# Patient Record
Sex: FEMALE | Race: BLACK OR AFRICAN AMERICAN | NOT HISPANIC OR LATINO | Employment: UNEMPLOYED | URBAN - METROPOLITAN AREA
[De-identification: names, ages, dates, MRNs, and addresses within clinical notes are randomized per-mention and may not be internally consistent; named-entity substitution may affect disease eponyms.]

---

## 2018-10-15 ENCOUNTER — OFFICE VISIT (OUTPATIENT)
Dept: GASTROENTEROLOGY | Facility: CLINIC | Age: 58
End: 2018-10-15

## 2018-10-15 VITALS — SYSTOLIC BLOOD PRESSURE: 145 MMHG | HEART RATE: 78 BPM | WEIGHT: 199.31 LBS | DIASTOLIC BLOOD PRESSURE: 87 MMHG

## 2018-10-15 DIAGNOSIS — K58.2 IRRITABLE BOWEL SYNDROME WITH BOTH CONSTIPATION AND DIARRHEA: ICD-10-CM

## 2018-10-15 DIAGNOSIS — R10.84 GENERALIZED ABDOMINAL PAIN: ICD-10-CM

## 2018-10-15 DIAGNOSIS — K52.9 CHRONIC DIARRHEA: Primary | ICD-10-CM

## 2018-10-15 PROCEDURE — 99999 PR PBB SHADOW E&M-NEW PATIENT-LVL III: CPT | Mod: PBBFAC,,, | Performed by: INTERNAL MEDICINE

## 2018-10-15 PROCEDURE — 99499 UNLISTED E&M SERVICE: CPT | Mod: S$PBB,,, | Performed by: INTERNAL MEDICINE

## 2018-10-15 PROCEDURE — 99203 OFFICE O/P NEW LOW 30 MIN: CPT | Mod: PBBFAC,PO | Performed by: INTERNAL MEDICINE

## 2018-10-15 NOTE — PROGRESS NOTES
Subjective:       Patient ID: Willa Ribeiro is a 57 y.o. female.    Chief Complaint: GI Problem (IBS) and Constipation    HPI  Review of Systems    Objective:      Physical Exam    Assessment:         Plan:

## 2018-10-15 NOTE — PATIENT INSTRUCTIONS
Tips to Control Acid Reflux    To control acid reflux, youll need to make some basic diet and lifestyle changes. The simple steps outlined below may be all youll need to ease discomfort.  Watch what you eat  · Avoid fatty foods and spicy foods.  · Eat fewer acidic foods, such as citrus and tomato-based foods. These can increase symptoms.  · Limit drinking alcohol, caffeine, and fizzy beverages. All increase acid reflux.  · Try limiting chocolate, peppermint, and spearmint. These can worsen acid reflux in some people.  Watch when you eat  · Avoid lying down for 3 hours after eating.  · Do not snack before going to bed.  Raise your head  Raising your head and upper body by 4 to 6 inches helps limit reflux when youre lying down. Put blocks under the head of your bed frame to raise it.  Other changes  · Lose weight, if you need to  · Dont exercise near bedtime  · Avoid tight-fitting clothes  · Limit aspirin and ibuprofen  · Stop smoking   Date Last Reviewed: 7/1/2016 © 2000-2017 [employer]. [address] [place] [address], [place] [address]. All rights reserved. This information is not intended as a substitute for professional medical care. Always follow your healthcare professional's instructions.        Tips to Control Acid Reflux    To control acid reflux, youll need to make some basic diet and lifestyle changes. The simple steps outlined below may be all youll need to ease discomfort.  Watch what you eat  · Avoid fatty foods and spicy foods.  · Eat fewer acidic foods, such as citrus and tomato-based foods. These can increase symptoms.  · Limit drinking alcohol, caffeine, and fizzy beverages. All increase acid reflux.  · Try limiting chocolate, peppermint, and spearmint. These can worsen acid reflux in some people.  Watch when you eat  · Avoid lying down for 3 hours after eating.  · Do not snack before going to bed.  Raise your head  Raising your head and upper body by 4 to 6 inches helps limit reflux  when youre lying down. Put blocks under the head of your bed frame to raise it.  Other changes  · Lose weight, if you need to  · Dont exercise near bedtime  · Avoid tight-fitting clothes  · Limit aspirin and ibuprofen  · Stop smoking   Date Last Reviewed: 7/1/2016  © 7897-1123 [employer]. [address] [place] [address], [place] [address]. All rights reserved. This information is not intended as a substitute for professional medical care. Always follow your healthcare professional's instructions.

## 2018-10-16 NOTE — PROGRESS NOTES
Subjective:       Patient ID: Willa Ribeiro is a 57 y.o. female.    Chief Complaint: GI Problem (IBS) and Constipation    HPI Patient with a life time history of IBS. She is having ongoing diarrhea alternating with constipation. She experiences bloating and diffuse abdominal pain.   The diarrhea is a main complaint. She has a BM after every meal up to 6 times per day. There is no weight loss. She is up to date with her colonoscopy. There is no family history of colon cancer.      Review of Systems   Constitutional: Negative for appetite change and fever.   HENT: Negative for sore throat and trouble swallowing.    Eyes: Negative for photophobia and visual disturbance.   Respiratory: Negative for wheezing.    Cardiovascular: Negative for chest pain and palpitations.   Gastrointestinal:        See HPI for details   Musculoskeletal: Negative for arthralgias, joint swelling and neck pain.   Skin: Negative for rash and wound.   Neurological: Negative for dizziness, tremors and weakness.   Psychiatric/Behavioral: Negative for behavioral problems and suicidal ideas.       Past Medical History:   Diagnosis Date    DM (diabetes mellitus)     HTN (hypertension)        Past Surgical History:   Procedure Laterality Date    CHOLECYSTECTOMY      HYSTERECTOMY         History reviewed. No pertinent family history.    Social History     Socioeconomic History    Marital status: Single     Spouse name: None    Number of children: None    Years of education: None    Highest education level: None   Social Needs    Financial resource strain: None    Food insecurity - worry: None    Food insecurity - inability: None    Transportation needs - medical: None    Transportation needs - non-medical: None   Occupational History    None   Tobacco Use    Smoking status: Never Smoker   Substance and Sexual Activity    Alcohol use: No     Frequency: Never    Drug use: None    Sexual activity: None   Other Topics Concern    None    Social History Narrative    None           Review of patient's allergies indicates:   Allergen Reactions    Penicillins Hives       Objective:      Physical Exam   Constitutional: She is oriented to person, place, and time. She appears well-nourished.   HENT:   Mouth/Throat: Oropharynx is clear and moist.   Eyes: Pupils are equal, round, and reactive to light.   Neck: Neck supple.   Cardiovascular: Normal heart sounds.   Pulmonary/Chest: Effort normal and breath sounds normal.   Abdominal: Soft. She exhibits no mass. There is no tenderness. There is no guarding.   Musculoskeletal: Normal range of motion.   Lymphadenopathy:     She has no cervical adenopathy.   Neurological: She is alert and oriented to person, place, and time.   Skin: Skin is warm. No rash noted.   Psychiatric: She has a normal mood and affect.       Assessment:       1. Chronic diarrhea    2. Generalized abdominal pain    3. Irritable bowel syndrome with both constipation and diarrhea        Plan:       Willa was seen today for gi problem and constipation.    Diagnoses and all orders for this visit:    Chronic diarrhea  -     Celiac Disease Panel; Future    Generalized abdominal pain  -     CT Abdomen Pelvis W Wo Contrast; Future

## 2018-10-17 ENCOUNTER — TELEPHONE (OUTPATIENT)
Dept: OBSTETRICS AND GYNECOLOGY | Facility: CLINIC | Age: 58
End: 2018-10-17

## 2018-10-17 ENCOUNTER — LAB VISIT (OUTPATIENT)
Dept: LAB | Facility: HOSPITAL | Age: 58
End: 2018-10-17
Attending: INTERNAL MEDICINE

## 2018-10-17 DIAGNOSIS — K52.9 CHRONIC DIARRHEA: ICD-10-CM

## 2018-10-17 DIAGNOSIS — R10.9 ABDOMINAL PAIN, UNSPECIFIED ABDOMINAL LOCATION: Primary | ICD-10-CM

## 2018-10-17 PROCEDURE — 83516 IMMUNOASSAY NONANTIBODY: CPT | Mod: 59

## 2018-10-18 ENCOUNTER — HOSPITAL ENCOUNTER (OUTPATIENT)
Dept: RADIOLOGY | Facility: HOSPITAL | Age: 58
Discharge: HOME | End: 2018-10-18
Attending: INTERNAL MEDICINE

## 2018-10-18 DIAGNOSIS — R10.84 GENERALIZED ABDOMINAL PAIN: ICD-10-CM

## 2018-10-18 PROCEDURE — 74178 CT ABD&PLV WO CNTR FLWD CNTR: CPT | Mod: 26,,, | Performed by: RADIOLOGY

## 2018-10-18 PROCEDURE — 25500020 PHARM REV CODE 255: Performed by: INTERNAL MEDICINE

## 2018-10-18 PROCEDURE — 74178 CT ABD&PLV WO CNTR FLWD CNTR: CPT | Mod: TC

## 2018-10-18 RX ADMIN — IOHEXOL 30 ML: 350 INJECTION, SOLUTION INTRAVENOUS at 11:10

## 2018-10-18 RX ADMIN — IOHEXOL 100 ML: 350 INJECTION, SOLUTION INTRAVENOUS at 01:10

## 2018-10-19 LAB
GLIADIN PEPTIDE IGA SER-ACNC: 5 UNITS
GLIADIN PEPTIDE IGG SER-ACNC: 3 UNITS
IGA SERPL-MCNC: 519 MG/DL
TTG IGA SER IA-ACNC: 5 UNITS
TTG IGG SER IA-ACNC: 4 UNITS

## 2018-10-23 ENCOUNTER — TELEPHONE (OUTPATIENT)
Dept: GASTROENTEROLOGY | Facility: CLINIC | Age: 58
End: 2018-10-23

## 2018-10-23 RX ORDER — DICYCLOMINE HYDROCHLORIDE 10 MG/1
10 CAPSULE ORAL 4 TIMES DAILY PRN
Qty: 120 CAPSULE | Refills: 6 | Status: SHIPPED | OUTPATIENT
Start: 2018-10-23 | End: 2018-11-22

## 2023-04-14 DIAGNOSIS — E66.9 DIABETES MELLITUS TYPE 2 IN OBESE: Primary | ICD-10-CM

## 2023-04-14 DIAGNOSIS — E11.69 DIABETES MELLITUS TYPE 2 IN OBESE: Primary | ICD-10-CM

## 2023-04-14 RX ORDER — SEMAGLUTIDE 1.34 MG/ML
INJECTION, SOLUTION SUBCUTANEOUS
Qty: 3 ML | Refills: 1 | Status: SHIPPED | OUTPATIENT
Start: 2023-04-14 | End: 2023-04-14 | Stop reason: SDUPTHER

## 2023-04-14 RX ORDER — SEMAGLUTIDE 1.34 MG/ML
INJECTION, SOLUTION SUBCUTANEOUS
Qty: 3 ML | Refills: 1 | Status: SHIPPED | OUTPATIENT
Start: 2023-04-14

## 2025-06-24 DIAGNOSIS — M25.562 KNEE PAIN, LEFT ANTERIOR: Primary | ICD-10-CM

## 2025-06-24 DIAGNOSIS — M23.8X2 KNEE CREPITUS, LEFT: ICD-10-CM

## 2025-06-24 DIAGNOSIS — M25.469 KNEE SWELLING: ICD-10-CM

## 2025-07-10 ENCOUNTER — HOSPITAL ENCOUNTER (EMERGENCY)
Facility: HOSPITAL | Age: 65
Discharge: HOME | End: 2025-07-10
Attending: EMERGENCY MEDICINE
Payer: MEDICAID

## 2025-07-10 VITALS
TEMPERATURE: 98 F | HEIGHT: 63 IN | HEART RATE: 92 BPM | OXYGEN SATURATION: 97 % | BODY MASS INDEX: 33.84 KG/M2 | RESPIRATION RATE: 16 BRPM | WEIGHT: 191 LBS | DIASTOLIC BLOOD PRESSURE: 75 MMHG | SYSTOLIC BLOOD PRESSURE: 152 MMHG

## 2025-07-10 DIAGNOSIS — M67.88 ACHILLES TENDINOSIS OF LEFT LOWER EXTREMITY: Primary | ICD-10-CM

## 2025-07-10 DIAGNOSIS — R52 PAIN: ICD-10-CM

## 2025-07-10 PROCEDURE — 99284 EMERGENCY DEPT VISIT MOD MDM: CPT | Mod: 25

## 2025-07-10 PROCEDURE — 63600175 PHARM REV CODE 636 W HCPCS: Mod: JZ,TB | Performed by: EMERGENCY MEDICINE

## 2025-07-10 PROCEDURE — 96372 THER/PROPH/DIAG INJ SC/IM: CPT | Performed by: EMERGENCY MEDICINE

## 2025-07-10 RX ORDER — KETOROLAC TROMETHAMINE 30 MG/ML
15 INJECTION, SOLUTION INTRAMUSCULAR; INTRAVENOUS
Status: COMPLETED | OUTPATIENT
Start: 2025-07-10 | End: 2025-07-10

## 2025-07-10 RX ORDER — LORATADINE 10 MG/1
TABLET ORAL
COMMUNITY
Start: 2025-06-18

## 2025-07-10 RX ORDER — INSULIN GLARGINE 100 [IU]/ML
INJECTION, SOLUTION SUBCUTANEOUS
COMMUNITY
Start: 2025-06-18

## 2025-07-10 RX ORDER — ASPIRIN 81 MG/1
81 TABLET ORAL
COMMUNITY

## 2025-07-10 RX ORDER — METFORMIN HYDROCHLORIDE 1000 MG/1
1000 TABLET ORAL
COMMUNITY

## 2025-07-10 RX ORDER — CALCIUM CITRATE/VITAMIN D3 200MG-6.25
TABLET ORAL
COMMUNITY
Start: 2025-06-18

## 2025-07-10 RX ORDER — GLUCOSAM/CHON-MSM1/C/MANG/BOSW 500-416.6
TABLET ORAL
COMMUNITY
Start: 2025-01-15

## 2025-07-10 RX ORDER — ROSUVASTATIN CALCIUM 10 MG/1
TABLET, COATED ORAL
COMMUNITY
Start: 2025-06-19

## 2025-07-10 RX ORDER — ACYCLOVIR 400 MG/1
TABLET ORAL
COMMUNITY
Start: 2025-06-18

## 2025-07-10 RX ORDER — TIRZEPATIDE 10 MG/.5ML
INJECTION, SOLUTION SUBCUTANEOUS
COMMUNITY
Start: 2025-06-18

## 2025-07-10 RX ORDER — DEXAMETHASONE SODIUM PHOSPHATE 4 MG/ML
8 INJECTION, SOLUTION INTRA-ARTICULAR; INTRALESIONAL; INTRAMUSCULAR; INTRAVENOUS; SOFT TISSUE
Status: COMPLETED | OUTPATIENT
Start: 2025-07-10 | End: 2025-07-10

## 2025-07-10 RX ORDER — PANTOPRAZOLE SODIUM 40 MG/1
40 TABLET, DELAYED RELEASE ORAL
COMMUNITY

## 2025-07-10 RX ORDER — LISINOPRIL AND HYDROCHLOROTHIAZIDE 20; 25 MG/1; MG/1
1 TABLET ORAL
COMMUNITY
Start: 2024-09-17

## 2025-07-10 RX ADMIN — KETOROLAC TROMETHAMINE 15 MG: 30 INJECTION, SOLUTION INTRAMUSCULAR; INTRAVENOUS at 09:07

## 2025-07-10 RX ADMIN — DEXAMETHASONE SODIUM PHOSPHATE 8 MG: 4 INJECTION, SOLUTION INTRA-ARTICULAR; INTRALESIONAL; INTRAMUSCULAR; INTRAVENOUS; SOFT TISSUE at 09:07

## 2025-07-11 NOTE — ED PROVIDER NOTES
Emergency Department Encounter  Provider Note  Encounter Date: 7/10/2025    Patient Name: Willa Ribeiro  MRN: [id number]    History of Present Illness   HPI  History of Present Illness:    Chief Complaint:   Chief Complaint   Patient presents with    Foot Injury     Left Heel pain x 4 weeks.  Has not been to a physician about this issue at this time.  Denies injury.  Stated she ices it at home and takes IBU without relief.       64-year-old female presenting with 5 weeks of left heel pain.  Atraumatic.  No fevers or chills.  Able to ambulate.  Has tried over-the-counter medication without improvement in symptoms.    The following PMH/PSH/SocHx/FamHx has been reviewed by myself:  Past Medical History:   Diagnosis Date    DM (diabetes mellitus)     HTN (hypertension)      Past Surgical History:   Procedure Laterality Date    CHOLECYSTECTOMY      HYSTERECTOMY       Social History[1]  No family history on file.  Allergies reviewed:  Review of patient's allergies indicates:   Allergen Reactions    Penicillins Hives     Medications reviewed:  Discharge Medication List as of 7/10/2025  9:36 PM        CONTINUE these medications which have NOT CHANGED    Details   acyclovir (ZOVIRAX) 400 MG tablet Historical Med      LANTUS SOLOSTAR U-100 INSULIN 100 unit/mL (3 mL) InPn pen Historical Med      lisinopriL-hydrochlorothiazide (PRINZIDE,ZESTORETIC) 20-25 mg Tab Take 1 tablet by mouth., Starting Tue 9/17/2024, Historical Med      loratadine (CLARITIN) 10 mg tablet Historical Med      MOUNJARO 10 mg/0.5 mL PnIj Historical Med      rosuvastatin (CRESTOR) 10 MG tablet Historical Med      TRUE METRIX GLUCOSE TEST STRIP Strp Historical Med      TRUEPLUS LANCETS 30 gauge Misc Historical Med      aspirin (ECOTRIN) 81 MG EC tablet Take 81 mg by mouth., Historical Med      metFORMIN (GLUCOPHAGE) 1000 MG tablet Take 1,000 mg by mouth., Historical Med      OZEMPIC 0.25 mg or 0.5 mg(2 mg/1.5 mL) pen injector 0.25mg injected once weekly for 4  weeks followed by 0.5mg injected once a week for 4 weeks, Print      pantoprazole (PROTONIX) 40 MG tablet Take 40 mg by mouth., Historical Med             Physical Exam     Initial Vitals [07/10/25 2036]   BP Pulse Resp Temp SpO2   (!) 152/75 92 16 98.2 °F (36.8 °C) 97 %      MAP       --           Triage vital signs reviewed.    Constitutional: Well-nourished, well-developed. Not in acute distress.  HENT: Normocephalic, atraumatic. Moist mucous membranes.  Eyes: No conjunctival injection.  Resp: Normal respiratory effort, breathing unlabored.  Cardio: Regular rate and rhythm.  GI: Abdomen non-distended.   MSK:  Swollen left heel, pain with palpation of the insertion of the Achilles tendon.  Able to range.  No laxity.  Skin: Warm and dry. No rashes or lesions noted.  Neuro: Awake and alert. Moves all extremities.    ED Course   Procedures    Medical Decision Making    History Acquisition     The history is provided by the patient.     Review of prior external/non ED notes:  Most recent eval GI for surveillance colonoscopy 11/2024    Differential Diagnoses   Based on available information and initial assessment, the working Differential Diagnosis includes, but is not limited to:  Fracture, dislocation, compartment syndrome, nerve injury/palsy, vascular injury, DVT, rhabdomyolysis, hemarthrosis, septic joint, cellulitis, bursitis, muscle strain, ligament tear/sprain, laceration, foreign body, abrasion, soft tissue contusion, osteoarthritis.    EKG   EKG ordered and independently reviewed by me:     Labs   Lab tests ordered and independently reviewed by me:    Labs Reviewed - No data to display    Imaging   Imaging ordered and independently reviewed by me:   Imaging Results              X-Ray Ankle Complete Left (Final result)  Result time 07/10/25 21:29:07      Final result by Emanuel Camp DO (07/10/25 21:29:07)                   Impression:      No acute osseous abnormality of the ankle or  foot.      Electronically signed by: Emanuel Camp  Date:    07/10/2025  Time:    21:29               Narrative:    EXAMINATION:  XR ANKLE COMPLETE 3 VIEW LEFT; XR FOOT COMPLETE 3 VIEW LEFT    CLINICAL HISTORY:  Pain, unspecified    TECHNIQUE:  AP, lateral and oblique radiographs of the left ankle.    AP, oblique, and lateral radiographs of the left foot.    COMPARISON:  None    FINDINGS:  Left ankle: No acute fracture or dislocation. Alignment is normal. The ankle mortise is congruent. The talar dome is intact. Joint spaces are preserved. No effusion.    Left foot: No acute fracture or dislocation. Alignment is normal. The Lisfranc articulation is congruent. There is joint space narrowing of the great toe metatarsophalangeal joint.  There is an os peroneum.                                       X-Ray Foot Complete Left (Final result)  Result time 07/10/25 21:29:07      Final result by Emanuel Camp DO (07/10/25 21:29:07)                   Impression:      No acute osseous abnormality of the ankle or foot.      Electronically signed by: Emanuel Camp  Date:    07/10/2025  Time:    21:29               Narrative:    EXAMINATION:  XR ANKLE COMPLETE 3 VIEW LEFT; XR FOOT COMPLETE 3 VIEW LEFT    CLINICAL HISTORY:  Pain, unspecified    TECHNIQUE:  AP, lateral and oblique radiographs of the left ankle.    AP, oblique, and lateral radiographs of the left foot.    COMPARISON:  None    FINDINGS:  Left ankle: No acute fracture or dislocation. Alignment is normal. The ankle mortise is congruent. The talar dome is intact. Joint spaces are preserved. No effusion.    Left foot: No acute fracture or dislocation. Alignment is normal. The Lisfranc articulation is congruent. There is joint space narrowing of the great toe metatarsophalangeal joint.  There is an os peroneum.                                         Additional Consideration   Willa Ribeiro  presents to the Emergency Department today with five weeks of left heel pain.   Consistent with Achilles tendinopathy versus bursitis.  Low concern for Achilles rupture.  X-ray, refer to Podiatry    Additional testing considered but not indicated during the course of this workup: further imaging and labwork, not indicated  Co-morbidities/chronic illness/exacerbation of chronic illness considered which impacted clinical decision making:  Diabetes, hypertension  Procedures done in the ED or plan for the OR: No  Social determinants of care considered during development of treatment plan include: Decreased medical literacy  Discussion of management or test interpretation with external provider: No  DNR discussion: No    The patient's list of active medications and allergies as documented per RN staff has been reviewed.  Medications given in the ED and/or prescribed:   Medications   ketorolac injection 15 mg (15 mg Intramuscular Given 7/10/25 2102)   dexAMETHasone injection 8 mg (8 mg Intramuscular Given 7/10/25 2104)                  Explanation of disposition: Discharge    Clinical Impression:     1. Achilles tendinosis of left lower extremity    2. Pain         All results from the workup were reviewed with the patient/family in detail. I discussed with the patient and/or the family/caregiver that today's evaluation in the ED does not suggest any emergent or life-threatening medical conditions that would require hospitalization or immediate intervention beyond what was provided in the ED. I believe the patient is safe for discharge.  However, a reassuring evaluation in the ED does not preclude the presence or development of a serious or life-threatening condition. As such, strict return precautions were discussed with the patient expressing understanding of instructions, and all questions answered. The patient/family communicates understanding of all this information and all remaining questions and concerns were addressed at this time.    The patient/family has been provided with verbal and  printed direction regarding our final diagnosis(es) as well as instructions regarding use of OTC and/or Rx medications intended to manage the patient's aforementioned conditions including:  ED Prescriptions    None       The patient's condition does not warrant review of the  and prescription of controlled substances.      ED Disposition Condition    Discharge Stable                 [1]   Social History  Tobacco Use    Smoking status: Never   Substance Use Topics    Alcohol use: No        Lalitha Burnett MD  07/10/25 1974

## 2025-07-11 NOTE — DISCHARGE INSTRUCTIONS
Your xrays show nothing broken. Your exam is consistent with Achilles tendinopathy. You can take Tylenol 1 gram every 8 hours, and ibuprofen 800 mg every 8 hours; this means you can take pain medicine once every 4 hours.    A referral to Podiatry has been given to you. Call the number provided if you don't hear from a  within a week.     [facility]  [address] [place] [id number] [phone]

## 2025-07-11 NOTE — ED NOTES
Pt c/o left heel pain & swelling, states the swelling extends into her ankle. Denies any radiation of pain to other areas of leg. Pt denies numbness or tingling to affected leg, pt is able to wiggle toes without difficulty. Pt states she has tried pain reducers at home, elevation and wrapping her foot with no relief. Family @ bedside with pt. Pt denies any needs at this time.

## 2025-07-23 ENCOUNTER — TELEPHONE (OUTPATIENT)
Dept: PODIATRY | Facility: CLINIC | Age: 65
End: 2025-07-23
Payer: MEDICAID

## 2025-07-23 NOTE — TELEPHONE ENCOUNTER
Attempted to reach out to Ms Ribeiro to assist her with making an appt with Dr Hernandez but she was unavailable.  left offering her appt for this upcoming Thursday. Call back encouraged. Will attempt to reach out to patient again during clinic hours.

## 2025-07-23 NOTE — TELEPHONE ENCOUNTER
Spoke with Ms Willa Ribeiro to assist her with making an appt with Dr Hernandez for an injury mateo her left heel. Accepted appt date and time for 7/24/25 at 3:45 pm. Verbalized understanding of clinic location with no other needs voiced at this time. Call back encouraged if needed.

## 2025-07-24 ENCOUNTER — OFFICE VISIT (OUTPATIENT)
Dept: PODIATRY | Facility: CLINIC | Age: 65
End: 2025-07-24
Payer: MEDICAID

## 2025-07-24 VITALS — DIASTOLIC BLOOD PRESSURE: 78 MMHG | SYSTOLIC BLOOD PRESSURE: 137 MMHG | HEART RATE: 106 BPM

## 2025-07-24 DIAGNOSIS — M21.70 LOWER LIMB LENGTH DIFFERENCE: Primary | ICD-10-CM

## 2025-07-24 DIAGNOSIS — M67.88 ACHILLES TENDINOSIS OF LEFT LOWER EXTREMITY: ICD-10-CM

## 2025-07-24 PROCEDURE — 99213 OFFICE O/P EST LOW 20 MIN: CPT | Mod: PBBFAC,PN | Performed by: PODIATRIST

## 2025-07-24 PROCEDURE — 3075F SYST BP GE 130 - 139MM HG: CPT | Mod: CPTII,,, | Performed by: PODIATRIST

## 2025-07-24 PROCEDURE — 99999 PR PBB SHADOW E&M-EST. PATIENT-LVL III: CPT | Mod: PBBFAC,,, | Performed by: PODIATRIST

## 2025-07-24 PROCEDURE — 4010F ACE/ARB THERAPY RXD/TAKEN: CPT | Mod: CPTII,,, | Performed by: PODIATRIST

## 2025-07-24 PROCEDURE — 99203 OFFICE O/P NEW LOW 30 MIN: CPT | Mod: S$PBB,,, | Performed by: PODIATRIST

## 2025-07-24 PROCEDURE — 1159F MED LIST DOCD IN RCRD: CPT | Mod: CPTII,,, | Performed by: PODIATRIST

## 2025-07-24 PROCEDURE — 1160F RVW MEDS BY RX/DR IN RCRD: CPT | Mod: CPTII,,, | Performed by: PODIATRIST

## 2025-07-24 PROCEDURE — 3078F DIAST BP <80 MM HG: CPT | Mod: CPTII,,, | Performed by: PODIATRIST

## 2025-07-24 RX ORDER — TRIAMCINOLONE ACETONIDE 0.25 MG/G
CREAM TOPICAL
COMMUNITY
Start: 2025-07-16

## 2025-07-24 RX ORDER — NABUMENTONE 750 MG/1
750 TABLET ORAL 2 TIMES DAILY PRN
Qty: 30 TABLET | Refills: 1 | Status: SHIPPED | OUTPATIENT
Start: 2025-07-24

## 2025-07-24 NOTE — PROGRESS NOTES
Subjective:     Patient ID: Willa Ribeiro is a 64 y.o. female.    Chief Complaint: Foot Injury (Left heel injury)    Presents with chief concern of pain to the left Achilles tendon since early  after going to a wedding.  States that she feels more relief with small wedges.  She wore high heels with his strap the rubbed over the Achilles which she believes caused some irritation.  Denies any specific injury.  She gets intermittent pain at rest but mostly with standing and walking.  Describes the pain as a sharp brief lasting pain to the Achilles region.  Relates history of scoliosis and possible lower limb length difference.  She has been taking ibuprofen for pain which only helps somewhat.    Vitals:    25 1602   BP: 137/78   Pulse: 106   PainSc:   4   PainLoc: Foot      Past Medical History:   Diagnosis Date    DM (diabetes mellitus)     HTN (hypertension)        Past Surgical History:   Procedure Laterality Date    CHOLECYSTECTOMY      HYSTERECTOMY         No family history on file.    Social History     Socioeconomic History    Marital status: Single   Tobacco Use    Smoking status: Never   Substance and Sexual Activity    Alcohol use: No     Social Drivers of Health     Financial Resource Strain: Not on File (3/9/2021)    Received from [facility]    Financial Resource Strain     Financial Resource Strain: 0   Food Insecurity: Not on File (2024)    Received from [facility]    Food Insecurity     Food: 0   Transportation Needs: Not on File (3/9/2021)    Received from [facility]    Transportation Needs     Transportation: 0   Physical Activity: Not on File (3/9/2021)    Received from [facility]    Physical Activity     Physical Activity: 0   Stress: Not on File (3/9/2021)    Received from [facility]    Stress     Stress: 0   Housing Stability: Not on File (3/9/2021)    Received from [facility]    Housing Stability     Housin       Current Medications[1]    Review of patient's allergies indicates:   Allergen Reactions     Pollen extracts Other (See Comments)     Watery eyes sneezing     Watery eyes sneezing    Penicillins Hives     PT REPORTS SHE IS NOT ALLERGIC         Review of Systems   Constitutional: Negative for chills and fever.   HENT:  Negative for congestion.    Cardiovascular:  Negative for chest pain and claudication.   Respiratory:  Negative for cough and shortness of breath.    Gastrointestinal:  Negative for nausea and vomiting.   Neurological:  Negative for numbness and paresthesias.   Psychiatric/Behavioral:  Negative for altered mental status.         Objective:     Physical Exam  Constitutional:       General: She is not in acute distress.     Appearance: She is not ill-appearing.   Cardiovascular:      Pulses:           Dorsalis pedis pulses are 2+ on the right side and 2+ on the left side.        Posterior tibial pulses are 2+ on the right side and 2+ on the left side.   Musculoskeletal:      Comments: Moderate localized pain on palpation overlying the midsubstance of the left Achilles tendon which is palpably enlarged with some localized edema.  No localized warmth.  Negative Snell test.  Mild pain with active resisted plantar flexion of the left foot to the Achilles tendon region.  No palpable defect along the Achilles tendon.    Mild pes planus bilateral.    When examined the patient appears as though the right lower extremity is longer than the left.  There appears to be some varus rotation to the left knee compared to the right.   Skin:     General: Skin is warm.      Capillary Refill: Capillary refill takes less than 2 seconds.      Findings: No ecchymosis or erythema.      Nails: There is no clubbing.   Neurological:      Mental Status: She is alert.           Assessment:      Encounter Diagnoses   Name Primary?    Achilles tendinosis of left lower extremity     Lower limb length difference Yes     Plan:     Willa was seen today for foot injury.    Diagnoses and all orders for this visit:    Lower limb  length difference  -     X-Ray Hip to Ankle; Future    Achilles tendinosis of left lower extremity  -     Ambulatory referral/consult to Podiatry  -     X-Ray Hip to Ankle; Future  -     Walking Boot For Home Use    Other orders  -     nabumetone (RELAFEN) 750 MG tablet; Take 1 tablet (750 mg total) by mouth 2 (two) times daily as needed.      I counseled the patient on her conditions, their implications and medical management.    Reviewed left foot and ankle x-ray completed on ED visit 07/10/2025.  No fracture or dislocation.  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  There is a moderate plantar calcaneal enthesopathy also noted.    From a clinical standpoint suspect intrasubstance tear of the left Achilles tendon which has led to the Achilles tendinosis and persistent pain.  Dispensed, fitted and applied tall Cam boot to left lower extremity with small Achilles wedge to help offload the tendon.  Boot to be worn when standing and walking.  Boot may be removed at night.  Recommend rest, ice and elevation as needed.  She can use her topical Biofreeze available at home.    NSAID therapy with Relafen as needed.  Discontinue use of ibuprofen.    Hip to ankle x-ray to assess for significant lower limb length difference.    RTC within 6 weeks to re-evaluate.  If she still has persistent pain patient will need follow up MRI.  Consider referral to physical therapy for eccentric loading once the tendon starts to heal.    Assisted by Obey Jacobs DPM PGY 3    .          [1]   Current Outpatient Medications   Medication Sig Dispense Refill    acyclovir (ZOVIRAX) 400 MG tablet       aspirin (ECOTRIN) 81 MG EC tablet Take 81 mg by mouth.      LANTUS SOLOSTAR U-100 INSULIN 100 unit/mL (3 mL) InPn pen       lisinopriL-hydrochlorothiazide (PRINZIDE,ZESTORETIC) 20-25 mg Tab Take 1 tablet by mouth.       loratadine (CLARITIN) 10 mg tablet       metFORMIN (GLUCOPHAGE) 1000 MG tablet Take 1,000 mg by mouth.      MOUNJARO 10 mg/0.5 mL PnIj       pantoprazole (PROTONIX) 40 MG tablet Take 40 mg by mouth.      rosuvastatin (CRESTOR) 10 MG tablet       triamcinolone acetonide 0.025% (KENALOG) 0.025 % cream SMARTSI Topical Daily      TRUE METRIX GLUCOSE TEST STRIP Strp       TRUEPLUS LANCETS 30 gauge Misc       nabumetone (RELAFEN) 750 MG tablet Take 1 tablet (750 mg total) by mouth 2 (two) times daily as needed. 30 tablet 1    OZEMPIC 0.25 mg or 0.5 mg(2 mg/1.5 mL) pen injector 0.25mg injected once weekly for 4 weeks followed by 0.5mg injected once a week for 4 weeks (Patient not taking: Reported on 2025) 3 mL 1     No current facility-administered medications for this visit.

## 2025-09-02 ENCOUNTER — OFFICE VISIT (OUTPATIENT)
Dept: PODIATRY | Facility: CLINIC | Age: 65
End: 2025-09-02
Payer: MEDICAID

## 2025-09-02 VITALS
DIASTOLIC BLOOD PRESSURE: 84 MMHG | SYSTOLIC BLOOD PRESSURE: 157 MMHG | BODY MASS INDEX: 33.83 KG/M2 | HEIGHT: 63 IN | HEART RATE: 77 BPM | WEIGHT: 190.94 LBS

## 2025-09-02 DIAGNOSIS — M67.88 ACHILLES TENDINOSIS OF LEFT LOWER EXTREMITY: Primary | ICD-10-CM

## 2025-09-02 DIAGNOSIS — M21.70 LOWER LIMB LENGTH DIFFERENCE: ICD-10-CM

## 2025-09-02 PROCEDURE — 99999 PR PBB SHADOW E&M-EST. PATIENT-LVL IV: CPT | Mod: PBBFAC,,, | Performed by: PODIATRIST

## 2025-09-02 PROCEDURE — 3079F DIAST BP 80-89 MM HG: CPT | Mod: CPTII,,, | Performed by: PODIATRIST

## 2025-09-02 PROCEDURE — 3008F BODY MASS INDEX DOCD: CPT | Mod: CPTII,,, | Performed by: PODIATRIST

## 2025-09-02 PROCEDURE — 3077F SYST BP >= 140 MM HG: CPT | Mod: CPTII,,, | Performed by: PODIATRIST

## 2025-09-02 PROCEDURE — 1159F MED LIST DOCD IN RCRD: CPT | Mod: CPTII,,, | Performed by: PODIATRIST

## 2025-09-02 PROCEDURE — 99213 OFFICE O/P EST LOW 20 MIN: CPT | Mod: S$PBB,,, | Performed by: PODIATRIST

## 2025-09-02 PROCEDURE — 1160F RVW MEDS BY RX/DR IN RCRD: CPT | Mod: CPTII,,, | Performed by: PODIATRIST

## 2025-09-02 PROCEDURE — 4010F ACE/ARB THERAPY RXD/TAKEN: CPT | Mod: CPTII,,, | Performed by: PODIATRIST

## 2025-09-02 PROCEDURE — 99214 OFFICE O/P EST MOD 30 MIN: CPT | Mod: PBBFAC,PN | Performed by: PODIATRIST
